# Patient Record
Sex: MALE | Race: WHITE
[De-identification: names, ages, dates, MRNs, and addresses within clinical notes are randomized per-mention and may not be internally consistent; named-entity substitution may affect disease eponyms.]

---

## 2020-01-25 ENCOUNTER — HOSPITAL ENCOUNTER (EMERGENCY)
Dept: HOSPITAL 53 - M ED | Age: 40
Discharge: HOME | End: 2020-01-25
Payer: OTHER GOVERNMENT

## 2020-01-25 VITALS — DIASTOLIC BLOOD PRESSURE: 65 MMHG | SYSTOLIC BLOOD PRESSURE: 117 MMHG

## 2020-01-25 VITALS — HEIGHT: 70 IN | WEIGHT: 158.73 LBS | BODY MASS INDEX: 22.72 KG/M2

## 2020-01-25 DIAGNOSIS — L03.211: ICD-10-CM

## 2020-01-25 DIAGNOSIS — F17.210: ICD-10-CM

## 2020-01-25 DIAGNOSIS — L02.01: Primary | ICD-10-CM

## 2020-01-25 DIAGNOSIS — Z79.2: ICD-10-CM

## 2020-01-25 LAB
ALBUMIN SERPL BCG-MCNC: 3.5 GM/DL (ref 3.2–5.2)
ALT SERPL W P-5'-P-CCNC: 27 U/L (ref 12–78)
BASOPHILS # BLD AUTO: 0.1 10^3/UL (ref 0–0.2)
BASOPHILS NFR BLD AUTO: 0.6 % (ref 0–1)
BILIRUB SERPL-MCNC: 0.3 MG/DL (ref 0.2–1)
BUN SERPL-MCNC: 15 MG/DL (ref 7–18)
CALCIUM SERPL-MCNC: 8.7 MG/DL (ref 8.5–10.1)
CHLORIDE SERPL-SCNC: 108 MEQ/L (ref 98–107)
CO2 SERPL-SCNC: 29 MEQ/L (ref 21–32)
CREAT SERPL-MCNC: 1.11 MG/DL (ref 0.7–1.3)
EOSINOPHIL # BLD AUTO: 0.3 10^3/UL (ref 0–0.5)
EOSINOPHIL NFR BLD AUTO: 2.6 % (ref 0–3)
GFR SERPL CREATININE-BSD FRML MDRD: > 60 ML/MIN/{1.73_M2} (ref 60–?)
GLUCOSE SERPL-MCNC: 99 MG/DL (ref 70–100)
HCT VFR BLD AUTO: 43.4 % (ref 42–52)
HGB BLD-MCNC: 14.1 G/DL (ref 13.5–17.5)
LYMPHOCYTES # BLD AUTO: 2.4 10^3/UL (ref 1.5–5)
LYMPHOCYTES NFR BLD AUTO: 23.6 % (ref 24–44)
MCH RBC QN AUTO: 31.3 PG (ref 27–33)
MCHC RBC AUTO-ENTMCNC: 32.5 G/DL (ref 32–36.5)
MCV RBC AUTO: 96.4 FL (ref 80–96)
MONOCYTES # BLD AUTO: 1.1 10^3/UL (ref 0–0.8)
MONOCYTES NFR BLD AUTO: 10.8 % (ref 0–5)
NEUTROPHILS # BLD AUTO: 6.3 10^3/UL (ref 1.5–8.5)
NEUTROPHILS NFR BLD AUTO: 62.1 % (ref 36–66)
PLATELET # BLD AUTO: 254 10^3/UL (ref 150–450)
POTASSIUM SERPL-SCNC: 4.4 MEQ/L (ref 3.5–5.1)
PROT SERPL-MCNC: 6.6 GM/DL (ref 6.4–8.2)
RBC # BLD AUTO: 4.5 10^6/UL (ref 4.3–6.1)
SODIUM SERPL-SCNC: 141 MEQ/L (ref 136–145)
WBC # BLD AUTO: 10.1 10^3/UL (ref 4–10)

## 2020-01-25 PROCEDURE — 70487 CT MAXILLOFACIAL W/DYE: CPT

## 2020-01-25 PROCEDURE — 85025 COMPLETE CBC W/AUTO DIFF WBC: CPT

## 2020-01-25 PROCEDURE — 80053 COMPREHEN METABOLIC PANEL: CPT

## 2020-01-25 PROCEDURE — 86140 C-REACTIVE PROTEIN: CPT

## 2020-01-25 PROCEDURE — 36415 COLL VENOUS BLD VENIPUNCTURE: CPT

## 2020-01-25 PROCEDURE — 99283 EMERGENCY DEPT VISIT LOW MDM: CPT

## 2020-01-25 NOTE — REPVR
PROCEDURE INFORMATION: 

Exam: CT Maxillofacial With Contrast, Sinus 

Exam date and time: 1/25/2020 5:01 AM 

Age: 39 years old 

Clinical indication: Mass, lump, or swelling; Other: Right eyebrow; Additional 

info: R-supraorbital swelling, concern for pre-septal cellulitis 



TECHNIQUE: 

Imaging protocol: CT Maxillofacial with intravenous contrast. Focus on the 

sinuses. 

Radiation optimization: All CT scans at this facility use at least one of these 

dose optimization techniques: automated exposure control; mA and/or kV 

adjustment per patient size (includes targeted exams where dose is matched to 

clinical indication); or iterative reconstruction. 

Contrast material: ISO; Contrast volume: 75 ml; Contrast route: AC;  



COMPARISON: 

No relevant prior studies available. 



FINDINGS: 

Frontal sinuses: Normal. No air-fluid levels. 

Ethmoid air cells: Normal. No air-fluid levels. 

Sphenoid sinuses: Normal. No air-fluid levels. 

Maxillary sinuses: Normal. No air-fluid levels. Ostiomeatal units are patent. 

Orbits: No involvement of the right orbital fat to suggest orbital cellulitis 

at this time. 

Nasal cavity/Septum: Unremarkable. 

Soft tissues: Right superior and lateral periorbital soft tissue swelling. 

Small rim enhancing superficial abscess along the superior lateral right 

periorbital soft tissues measuring 9 mm on series 201, image 34. 

Bones/joints: Unremarkable. 



IMPRESSION: 

1. Right superior and lateral periorbital soft tissue swelling. Small rim 

enhancing superficial abscess along the superior lateral right periorbital soft 

tissues measuring 9 mm on series 201, image 34. 

2. No involvement of the right orbital fat to suggest orbital cellulitis at 

this time. 



Electronically signed by: Arie Caballero On 01/25/2020  05:45:11 AM

## 2020-06-06 ENCOUNTER — HOSPITAL ENCOUNTER (EMERGENCY)
Dept: HOSPITAL 53 - M ED | Age: 40
Discharge: HOME | End: 2020-06-06
Payer: OTHER GOVERNMENT

## 2020-06-06 VITALS — DIASTOLIC BLOOD PRESSURE: 72 MMHG | SYSTOLIC BLOOD PRESSURE: 119 MMHG

## 2020-06-06 VITALS — WEIGHT: 156.53 LBS | BODY MASS INDEX: 22.41 KG/M2 | HEIGHT: 70 IN

## 2020-06-06 DIAGNOSIS — F17.218: ICD-10-CM

## 2020-06-06 DIAGNOSIS — M51.26: Primary | ICD-10-CM

## 2020-06-06 LAB
ALBUMIN SERPL BCG-MCNC: 3.8 GM/DL (ref 3.2–5.2)
ALT SERPL W P-5'-P-CCNC: 17 U/L (ref 12–78)
BILIRUB CONJ SERPL-MCNC: < 0.1 MG/DL (ref 0–0.2)
BILIRUB SERPL-MCNC: 0.4 MG/DL (ref 0.2–1)
BUN SERPL-MCNC: 15 MG/DL (ref 7–18)
CALCIUM SERPL-MCNC: 8.5 MG/DL (ref 8.5–10.1)
CHLORIDE SERPL-SCNC: 110 MEQ/L (ref 98–107)
CO2 SERPL-SCNC: 28 MEQ/L (ref 21–32)
CREAT SERPL-MCNC: 1.1 MG/DL (ref 0.7–1.3)
GFR SERPL CREATININE-BSD FRML MDRD: > 60 ML/MIN/{1.73_M2} (ref 60–?)
GLUCOSE SERPL-MCNC: 80 MG/DL (ref 70–100)
HCT VFR BLD AUTO: 41 % (ref 42–52)
HGB BLD-MCNC: 13.7 G/DL (ref 13.5–17.5)
LIPASE SERPL-CCNC: 74 U/L (ref 73–393)
MCH RBC QN AUTO: 31.4 PG (ref 27–33)
MCHC RBC AUTO-ENTMCNC: 33.4 G/DL (ref 32–36.5)
MCV RBC AUTO: 94 FL (ref 80–96)
PLATELET # BLD AUTO: 255 10^3/UL (ref 150–450)
POTASSIUM SERPL-SCNC: 4.3 MEQ/L (ref 3.5–5.1)
PROT SERPL-MCNC: 7.3 GM/DL (ref 6.4–8.2)
RBC # BLD AUTO: 4.36 10^6/UL (ref 4.3–6.1)
SODIUM SERPL-SCNC: 143 MEQ/L (ref 136–145)
WBC # BLD AUTO: 6.5 10^3/UL (ref 4–10)

## 2020-06-07 NOTE — ED PDOC
Post-Departure Follow-Up


ct ls spine faxed to dr whittington for fu mlg Lundborg-Gray,Maja MD           Jun 7, 2020 07:37